# Patient Record
Sex: MALE | Race: WHITE | Employment: OTHER | URBAN - METROPOLITAN AREA
[De-identification: names, ages, dates, MRNs, and addresses within clinical notes are randomized per-mention and may not be internally consistent; named-entity substitution may affect disease eponyms.]

---

## 2017-01-10 NOTE — PATIENT DISCUSSION
(R33.242) Keratoconjunct sicca, not specified as Sjogren's, bilateral - Assesment : Examination revealed Dry Eye Syndrome  Pt uses ATs bid. - Plan : Monitor for changes. Continue ATs daily 2-3 times per day.

## 2017-01-10 NOTE — PATIENT DISCUSSION
(H25.13) Age-related nuclear cataract, bilateral - Assesment : Examination revealed cataract. - Plan : Monitor for changes. Updated GLRx given today. Pt to call with vision changes or if becomes more bothered by visual symptoms before next appt. RTC in 1 year for Exam, sooner if problems or changes.

## 2018-03-06 NOTE — PATIENT DISCUSSION
(H25.13) Age-related nuclear cataract, bilateral - Assesment : Examination revealed cataract. - Plan : Monitor for changes. Advised of condition of cataracts and symptoms of worsening. Updated GLRx given today. Pt to call with vision changes or if becomes more bothered before next visit. RTC in 1 year for Exam, sooner if problems or changes.

## 2019-03-06 NOTE — PATIENT DISCUSSION
Monitor for changes. Advised of condition of cataracts and explained symptoms of worsening and becoming more bothersome. Pt to call with increased vision changes or if becomes more bothered by visual symptoms before next visit.

## 2019-03-06 NOTE — PATIENT DISCUSSION
(H25.13) Age-related nuclear cataract, bilateral - Assesment : Examination revealed cataract. - Plan : Monitor for changes. Advised of condition of cataracts and explained symptoms of worsening and becoming more bothersome. Pt to call with increased vision changes or if becomes more bothered by visual symptoms before next visit. RTC in 1 year for Exam, sooner if problems or changes.

## 2021-02-12 NOTE — PATIENT DISCUSSION
"Advised to stop Visine. Advised that Visine and other ""get the red out"" drops are more irritating to the eyes and should be avoided and regular ATs used regularly instead. "

## 2022-04-28 ENCOUNTER — ESTABLISHED PATIENT (OUTPATIENT)
Dept: URBAN - METROPOLITAN AREA EYE CENTER 2 | Facility: EYE CENTER | Age: 72
End: 2022-04-28

## 2022-04-28 DIAGNOSIS — H25.13: ICD-10-CM

## 2022-04-28 DIAGNOSIS — Z98.890: ICD-10-CM

## 2022-04-28 PROCEDURE — 92014 COMPRE OPH EXAM EST PT 1/>: CPT

## 2022-04-28 ASSESSMENT — TONOMETRY
OD_IOP_MMHG: 8
OS_IOP_MMHG: 8

## 2022-04-28 ASSESSMENT — VISUAL ACUITY
OS_SC: 20/40-1
OD_SC: 20/50-1
OU_SC: 20/40-1

## 2022-04-28 NOTE — PATIENT DISCUSSION
VISUALLY SIGNIFICANT: Recommend referral to Dr. Emir Dove. I have discussed the option of glasses versus cataract surgery versus following. It was explained that when the patients vision no longer meets their visual needs and a glasses prescription does not improve visual symptoms, the option of cataract surgery is a reasonable next step. It was explained that there is no guarantee that removing the cataract will improve their visual symptoms, however; it is believed that the cataract is contributing to the patient's visual impairment and surgery may improve both the visual and functional status of the patient. The risks, benefits and alternatives of surgery were discussed with the patient. After this discussion, the patient desires to proceed with cataract surgery with implantation of an intraocular lens to improve vision. Patient to call back when ready to schedule referral for cataract surgery.

## 2022-07-03 RX ORDER — IBUPROFEN 200 MG
TABLET ORAL
COMMUNITY

## 2022-07-03 RX ORDER — DEXTROAMPHETAMINE SACCHARATE, AMPHETAMINE ASPARTATE, DEXTROAMPHETAMINE SULFATE AND AMPHETAMINE SULFATE 3.75; 3.75; 3.75; 3.75 MG/1; MG/1; MG/1; MG/1
TABLET ORAL
COMMUNITY
Start: 2022-03-25 | End: 2022-08-24 | Stop reason: SDUPTHER

## 2022-08-03 PROBLEM — R31.0 GROSS HEMATURIA: Status: ACTIVE | Noted: 2022-08-03

## 2022-08-03 PROBLEM — N28.89 RIGHT RENAL MASS: Status: ACTIVE | Noted: 2022-08-03

## 2022-08-03 PROBLEM — M75.121 NONTRAUMATIC COMPLETE TEAR OF RIGHT ROTATOR CUFF: Status: ACTIVE | Noted: 2022-08-03

## 2022-08-03 PROBLEM — N40.1 BENIGN PROSTATIC HYPERPLASIA WITH LOWER URINARY TRACT SYMPTOMS: Status: ACTIVE | Noted: 2022-08-03

## 2022-08-03 PROBLEM — E78.49 OTHER HYPERLIPIDEMIA: Status: ACTIVE | Noted: 2022-08-03

## 2022-08-03 PROBLEM — Z96.651 STATUS POST TOTAL RIGHT KNEE REPLACEMENT: Status: ACTIVE | Noted: 2022-08-03

## 2022-08-03 PROBLEM — R33.9 INCOMPLETE BLADDER EMPTYING: Status: ACTIVE | Noted: 2022-08-03

## 2022-08-03 PROBLEM — F90.0 ATTENTION DEFICIT HYPERACTIVITY DISORDER, PREDOMINANTLY INATTENTIVE TYPE: Status: ACTIVE | Noted: 2022-08-03

## 2022-08-03 PROBLEM — G47.00 INSOMNIA: Status: ACTIVE | Noted: 2022-08-03

## 2022-08-03 PROBLEM — M47.812 CERVICAL SPONDYLOSIS: Status: ACTIVE | Noted: 2022-08-03

## 2022-08-03 PROBLEM — C64.9 RENAL CANCER (HCC): Status: ACTIVE | Noted: 2022-08-03

## 2022-08-03 PROBLEM — M19.011 PRIMARY OSTEOARTHRITIS, RIGHT SHOULDER: Status: ACTIVE | Noted: 2022-08-03

## 2022-08-03 PROBLEM — R36.1 HEMATOSPERMIA: Status: ACTIVE | Noted: 2022-08-03

## 2022-08-04 PROBLEM — G89.11 ACUTE PAIN DUE TO INJURY: Status: ACTIVE | Noted: 2022-07-04

## 2022-08-04 PROBLEM — S22.41XA CLOSED FRACTURE OF TWO RIBS OF RIGHT SIDE: Status: ACTIVE | Noted: 2022-07-04

## 2022-08-04 PROBLEM — M51.26 DISPLACEMENT OF LUMBAR INTERVERTEBRAL DISC WITHOUT MYELOPATHY: Status: ACTIVE | Noted: 2022-08-04

## 2022-08-04 PROBLEM — G89.4 CHRONIC PAIN DISORDER: Status: ACTIVE | Noted: 2022-08-04

## 2022-08-04 PROBLEM — S22.089A FRACTURE OF ELEVENTH THORACIC VERTEBRA (HCC): Status: ACTIVE | Noted: 2022-07-04

## 2022-08-04 PROBLEM — M47.817 LUMBOSACRAL SPONDYLOSIS WITHOUT MYELOPATHY: Status: ACTIVE | Noted: 2022-08-04

## 2022-08-04 PROBLEM — M54.2 NECK PAIN: Status: ACTIVE | Noted: 2022-08-04

## 2022-08-04 PROBLEM — M51.36 DEGENERATION OF LUMBAR INTERVERTEBRAL DISC: Status: ACTIVE | Noted: 2022-08-04

## 2022-10-04 NOTE — PATIENT DISCUSSION
Pt to call with any vision changes, increase in floaters, or if seeing flashes, dark shadows, or a curtain/veil across vision.

## 2022-10-29 PROBLEM — H26.9 CATARACT: Status: ACTIVE | Noted: 2022-10-29

## 2022-10-29 PROBLEM — M19.90 ARTHRITIS: Status: ACTIVE | Noted: 2022-10-29

## 2022-10-29 PROBLEM — F90.9 ATTENTION DEFICIT HYPERACTIVITY DISORDER: Status: ACTIVE | Noted: 2022-08-03

## 2022-10-29 PROBLEM — Z97.3 WEARS GLASSES: Status: ACTIVE | Noted: 2022-10-29

## 2022-11-03 ENCOUNTER — ESTABLISHED PATIENT (OUTPATIENT)
Dept: URBAN - METROPOLITAN AREA CLINIC 8 | Facility: CLINIC | Age: 72
End: 2022-11-03

## 2022-11-03 DIAGNOSIS — H01.003: ICD-10-CM

## 2022-11-03 DIAGNOSIS — Z98.890: ICD-10-CM

## 2022-11-03 DIAGNOSIS — H25.13: ICD-10-CM

## 2022-11-03 DIAGNOSIS — H01.006: ICD-10-CM

## 2022-11-03 PROCEDURE — 92014 COMPRE OPH EXAM EST PT 1/>: CPT

## 2022-11-03 ASSESSMENT — TONOMETRY
OS_IOP_MMHG: 13
OD_IOP_MMHG: 14

## 2022-11-03 ASSESSMENT — VISUAL ACUITY
OS_SC: 20/40-2
OD_PH: 20/40-2
OD_SC: 20/60+2
OU_SC: 20/40-2

## 2022-11-03 NOTE — PATIENT DISCUSSION
VISUALLY SIGNIFICANT: Recommend referral to Dr. Laverne Sanders. I have discussed the option of glasses versus cataract surgery versus following. It was explained that when the patients vision no longer meets their visual needs and a glasses prescription does not improve visual symptoms, the option of cataract surgery is a reasonable next step. It was explained that there is no guarantee that removing the cataract will improve their visual symptoms, however; it is believed that the cataract is contributing to the patient's visual impairment and surgery may improve both the visual and functional status of the patient. The risks, benefits and alternatives of surgery were discussed with the patient. After this discussion, the patient desires to proceed with cataract surgery with implantation of an intraocular lens to improve vision. Patient to call back when ready to schedule referral for cataract surgery.

## 2023-06-05 ENCOUNTER — PRE-OP/H&P (OUTPATIENT)
Dept: URBAN - METROPOLITAN AREA CLINIC 6 | Facility: CLINIC | Age: 73
End: 2023-06-05

## 2023-06-05 VITALS — DIASTOLIC BLOOD PRESSURE: 92 MMHG | HEIGHT: 60 IN | SYSTOLIC BLOOD PRESSURE: 169 MMHG | HEART RATE: 69 BPM

## 2023-06-05 DIAGNOSIS — H01.024: ICD-10-CM

## 2023-06-05 DIAGNOSIS — H25.13: ICD-10-CM

## 2023-06-05 DIAGNOSIS — H01.021: ICD-10-CM

## 2023-06-05 PROCEDURE — 92136 OPHTHALMIC BIOMETRY: CPT

## 2023-06-05 PROCEDURE — 92014 COMPRE OPH EXAM EST PT 1/>: CPT

## 2023-06-05 ASSESSMENT — VISUAL ACUITY
OD_PH: 20/40-2
OD_SC: 20/50
OS_SC: 20/40
OU_SC: 20/50+2
OS_PH: 20/40-2

## 2023-06-05 ASSESSMENT — TONOMETRY
OS_IOP_MMHG: 8
OD_IOP_MMHG: 10

## 2023-08-31 ENCOUNTER — POST-OP (OUTPATIENT)
Dept: URBAN - METROPOLITAN AREA SURGERY 6 | Facility: SURGERY | Age: 73
End: 2023-08-31

## 2023-08-31 DIAGNOSIS — Z96.1: ICD-10-CM

## 2023-08-31 DIAGNOSIS — H25.11: ICD-10-CM

## 2023-08-31 PROCEDURE — 92136 OPHTHALMIC BIOMETRY: CPT

## 2023-08-31 PROCEDURE — 99024 POSTOP FOLLOW-UP VISIT: CPT

## 2023-08-31 ASSESSMENT — TONOMETRY: OS_IOP_MMHG: 15

## 2023-08-31 ASSESSMENT — VISUAL ACUITY: OS_SC: 20/40-2

## 2023-09-21 ENCOUNTER — POST-OP (OUTPATIENT)
Dept: URBAN - METROPOLITAN AREA CLINIC 10 | Facility: CLINIC | Age: 73
End: 2023-09-21

## 2023-09-21 DIAGNOSIS — Z96.1: ICD-10-CM

## 2023-09-21 PROCEDURE — 99024 POSTOP FOLLOW-UP VISIT: CPT

## 2023-09-21 ASSESSMENT — VISUAL ACUITY
OD_SC: 20/25-2
OS_SC: 20/30

## 2023-09-21 ASSESSMENT — TONOMETRY
OS_IOP_MMHG: 10
OD_IOP_MMHG: 17

## 2023-10-10 ENCOUNTER — POST-OP (OUTPATIENT)
Dept: URBAN - METROPOLITAN AREA CLINIC 10 | Facility: CLINIC | Age: 73
End: 2023-10-10

## 2023-10-10 DIAGNOSIS — Z96.1: ICD-10-CM

## 2023-10-10 DIAGNOSIS — H01.021: ICD-10-CM

## 2023-10-10 DIAGNOSIS — H01.024: ICD-10-CM

## 2023-10-10 PROCEDURE — 99024 POSTOP FOLLOW-UP VISIT: CPT

## 2023-10-10 ASSESSMENT — TONOMETRY
OD_IOP_MMHG: 14
OS_IOP_MMHG: 14

## 2023-10-10 ASSESSMENT — VISUAL ACUITY
OS_SC: 20/20-2
OD_SC: 20/20-1

## 2024-04-10 ENCOUNTER — ESTABLISHED PATIENT (OUTPATIENT)
Dept: URBAN - METROPOLITAN AREA CLINIC 10 | Facility: CLINIC | Age: 74
End: 2024-04-10

## 2024-04-10 DIAGNOSIS — H01.024: ICD-10-CM

## 2024-04-10 DIAGNOSIS — H01.021: ICD-10-CM

## 2024-04-10 DIAGNOSIS — Z96.1: ICD-10-CM

## 2024-04-10 DIAGNOSIS — Z98.890: ICD-10-CM

## 2024-04-10 PROCEDURE — 92014 COMPRE OPH EXAM EST PT 1/>: CPT

## 2024-04-10 ASSESSMENT — VISUAL ACUITY
OS_SC: 20/25
OU_SC: 20/25+1
OD_SC: 20/25-2

## 2024-04-10 ASSESSMENT — TONOMETRY
OD_IOP_MMHG: 9
OS_IOP_MMHG: 7

## 2025-04-04 ENCOUNTER — COMPREHENSIVE EXAM (OUTPATIENT)
Age: 75
End: 2025-04-04

## 2025-04-04 DIAGNOSIS — Z96.1: ICD-10-CM

## 2025-04-04 DIAGNOSIS — H01.024: ICD-10-CM

## 2025-04-04 DIAGNOSIS — H01.021: ICD-10-CM

## 2025-04-04 PROCEDURE — 92014 COMPRE OPH EXAM EST PT 1/>: CPT
